# Patient Record
Sex: FEMALE | Race: BLACK OR AFRICAN AMERICAN | Employment: FULL TIME | URBAN - METROPOLITAN AREA
[De-identification: names, ages, dates, MRNs, and addresses within clinical notes are randomized per-mention and may not be internally consistent; named-entity substitution may affect disease eponyms.]

---

## 2022-05-09 ENCOUNTER — HOSPITAL ENCOUNTER (EMERGENCY)
Facility: HOSPITAL | Age: 22
Discharge: HOME | End: 2022-05-09
Attending: EMERGENCY MEDICINE

## 2022-05-09 VITALS
HEART RATE: 75 BPM | DIASTOLIC BLOOD PRESSURE: 63 MMHG | OXYGEN SATURATION: 97 % | HEIGHT: 65 IN | TEMPERATURE: 98 F | WEIGHT: 225 LBS | SYSTOLIC BLOOD PRESSURE: 115 MMHG | BODY MASS INDEX: 37.49 KG/M2 | RESPIRATION RATE: 16 BRPM

## 2022-05-09 DIAGNOSIS — H92.01 EAR PAIN, RIGHT: Primary | ICD-10-CM

## 2022-05-09 PROCEDURE — 99283 EMERGENCY DEPT VISIT LOW MDM: CPT

## 2022-05-09 RX ORDER — PSEUDOEPHEDRINE HYDROCHLORIDE 30 MG/1
30 TABLET ORAL EVERY 4 HOURS PRN
Qty: 24 TABLET | Refills: 0 | Status: SHIPPED | OUTPATIENT
Start: 2022-05-09

## 2022-08-15 ENCOUNTER — APPOINTMENT (OUTPATIENT)
Dept: GENERAL RADIOLOGY | Facility: HOSPITAL | Age: 22
End: 2022-08-15
Attending: EMERGENCY MEDICINE

## 2022-08-15 ENCOUNTER — HOSPITAL ENCOUNTER (EMERGENCY)
Facility: HOSPITAL | Age: 22
Discharge: HOME | End: 2022-08-15
Attending: EMERGENCY MEDICINE

## 2022-08-15 VITALS
HEART RATE: 64 BPM | HEIGHT: 65 IN | DIASTOLIC BLOOD PRESSURE: 75 MMHG | OXYGEN SATURATION: 99 % | WEIGHT: 215 LBS | SYSTOLIC BLOOD PRESSURE: 138 MMHG | RESPIRATION RATE: 20 BRPM | TEMPERATURE: 98 F | BODY MASS INDEX: 35.82 KG/M2

## 2022-08-15 DIAGNOSIS — R09.1 PLEURITIS: Primary | ICD-10-CM

## 2022-08-15 PROCEDURE — 99283 EMERGENCY DEPT VISIT LOW MDM: CPT

## 2022-08-15 PROCEDURE — 93005 ELECTROCARDIOGRAM TRACING: CPT

## 2022-08-15 PROCEDURE — 71045 X-RAY EXAM CHEST 1 VIEW: CPT | Performed by: EMERGENCY MEDICINE

## 2022-08-15 PROCEDURE — 93010 ELECTROCARDIOGRAM REPORT: CPT | Performed by: EMERGENCY MEDICINE

## 2022-08-15 PROCEDURE — 99284 EMERGENCY DEPT VISIT MOD MDM: CPT

## 2022-08-15 RX ORDER — NAPROXEN 500 MG/1
500 TABLET ORAL 2 TIMES DAILY PRN
Qty: 20 TABLET | Refills: 0 | Status: SHIPPED | OUTPATIENT
Start: 2022-08-15 | End: 2022-08-22

## 2022-08-16 NOTE — ED INITIAL ASSESSMENT (HPI)
Pt reports having right side pain when she breath in and out that started today. Pt reports no fever, and no N/V. Pt reports 8/10 pain.

## 2022-11-14 ENCOUNTER — APPOINTMENT (OUTPATIENT)
Dept: GENERAL RADIOLOGY | Facility: HOSPITAL | Age: 22
End: 2022-11-14
Attending: NURSE PRACTITIONER

## 2022-11-14 ENCOUNTER — HOSPITAL ENCOUNTER (EMERGENCY)
Facility: HOSPITAL | Age: 22
Discharge: HOME | End: 2022-11-14

## 2022-11-14 VITALS
TEMPERATURE: 98 F | OXYGEN SATURATION: 98 % | DIASTOLIC BLOOD PRESSURE: 89 MMHG | HEART RATE: 80 BPM | WEIGHT: 230 LBS | SYSTOLIC BLOOD PRESSURE: 142 MMHG | HEIGHT: 65 IN | RESPIRATION RATE: 18 BRPM | BODY MASS INDEX: 38.32 KG/M2

## 2022-11-14 DIAGNOSIS — S39.012A STRAIN OF LUMBAR REGION, INITIAL ENCOUNTER: Primary | ICD-10-CM

## 2022-11-14 PROCEDURE — 99283 EMERGENCY DEPT VISIT LOW MDM: CPT

## 2022-11-14 PROCEDURE — 96372 THER/PROPH/DIAG INJ SC/IM: CPT

## 2022-11-14 PROCEDURE — 72100 X-RAY EXAM L-S SPINE 2/3 VWS: CPT | Performed by: NURSE PRACTITIONER

## 2022-11-14 RX ORDER — CYCLOBENZAPRINE HCL 5 MG
10 TABLET ORAL ONCE
Status: COMPLETED | OUTPATIENT
Start: 2022-11-14 | End: 2022-11-14

## 2022-11-14 RX ORDER — CYCLOBENZAPRINE HCL 10 MG
10 TABLET ORAL 3 TIMES DAILY PRN
Qty: 20 TABLET | Refills: 0 | Status: SHIPPED | OUTPATIENT
Start: 2022-11-14 | End: 2022-11-21

## 2022-11-14 RX ORDER — HYDROCODONE BITARTRATE AND ACETAMINOPHEN 5; 325 MG/1; MG/1
1 TABLET ORAL ONCE
Status: COMPLETED | OUTPATIENT
Start: 2022-11-14 | End: 2022-11-14

## 2022-11-14 RX ORDER — KETOROLAC TROMETHAMINE 30 MG/ML
60 INJECTION, SOLUTION INTRAMUSCULAR; INTRAVENOUS ONCE
Status: COMPLETED | OUTPATIENT
Start: 2022-11-14 | End: 2022-11-14

## 2022-11-14 RX ORDER — METHYLPREDNISOLONE 4 MG/1
TABLET ORAL
Qty: 21 TABLET | Refills: 0 | Status: SHIPPED | OUTPATIENT
Start: 2022-11-14

## 2022-11-15 NOTE — ED INITIAL ASSESSMENT (HPI)
Patient to ER from home with c/o atraumatic low back pain. Patient states she took naproxen 2 hours ago.

## 2023-04-26 ENCOUNTER — HOSPITAL ENCOUNTER (EMERGENCY)
Facility: HOSPITAL | Age: 23
Discharge: HOME | End: 2023-04-26
Attending: STUDENT IN AN ORGANIZED HEALTH CARE EDUCATION/TRAINING PROGRAM

## 2023-04-26 VITALS
TEMPERATURE: 99 F | BODY MASS INDEX: 38.32 KG/M2 | HEART RATE: 81 BPM | SYSTOLIC BLOOD PRESSURE: 145 MMHG | DIASTOLIC BLOOD PRESSURE: 71 MMHG | HEIGHT: 65 IN | RESPIRATION RATE: 20 BRPM | WEIGHT: 230 LBS | OXYGEN SATURATION: 100 %

## 2023-04-26 DIAGNOSIS — J02.9 ACUTE VIRAL PHARYNGITIS: Primary | ICD-10-CM

## 2023-04-26 LAB
FLUAV + FLUBV RNA SPEC NAA+PROBE: NEGATIVE
FLUAV + FLUBV RNA SPEC NAA+PROBE: NEGATIVE
RSV RNA SPEC NAA+PROBE: NEGATIVE
S PYO AG THROAT QL: NEGATIVE
SARS-COV-2 RNA RESP QL NAA+PROBE: NOT DETECTED

## 2023-04-26 PROCEDURE — 87880 STREP A ASSAY W/OPTIC: CPT

## 2023-04-26 PROCEDURE — 99283 EMERGENCY DEPT VISIT LOW MDM: CPT

## 2023-04-26 PROCEDURE — 0241U SARS-COV-2/FLU A AND B/RSV BY PCR (GENEXPERT): CPT | Performed by: STUDENT IN AN ORGANIZED HEALTH CARE EDUCATION/TRAINING PROGRAM

## 2023-04-26 RX ORDER — IBUPROFEN 600 MG/1
600 TABLET ORAL ONCE
Status: COMPLETED | OUTPATIENT
Start: 2023-04-26 | End: 2023-04-26

## 2023-05-01 ENCOUNTER — HOSPITAL ENCOUNTER (EMERGENCY)
Facility: HOSPITAL | Age: 23
Discharge: HOME | End: 2023-05-01
Attending: EMERGENCY MEDICINE

## 2023-05-01 VITALS
SYSTOLIC BLOOD PRESSURE: 147 MMHG | WEIGHT: 230 LBS | RESPIRATION RATE: 16 BRPM | DIASTOLIC BLOOD PRESSURE: 85 MMHG | OXYGEN SATURATION: 98 % | BODY MASS INDEX: 38.32 KG/M2 | TEMPERATURE: 98 F | HEIGHT: 65 IN | HEART RATE: 78 BPM

## 2023-05-01 DIAGNOSIS — R07.0 THROAT PAIN IN ADULT: Primary | ICD-10-CM

## 2023-05-01 PROCEDURE — 99283 EMERGENCY DEPT VISIT LOW MDM: CPT

## 2023-05-01 RX ORDER — AMOXICILLIN 500 MG/1
500 TABLET, FILM COATED ORAL 2 TIMES DAILY
Qty: 20 TABLET | Refills: 0 | Status: SHIPPED | OUTPATIENT
Start: 2023-05-01 | End: 2023-05-11

## 2023-05-01 NOTE — ED QUICK NOTES
Spoke with triage tech, stated that patient's strep test was completed and negative. ER tech stated he will chart the result.

## 2023-05-01 NOTE — ED INITIAL ASSESSMENT (HPI)
Pt ambulatory to ED A&O x 4 w/ c/o: Sore throat & mid/lower back pain since last Wed. Was seen in ED Wed, had negative strep test.  Pt reporting pain went away and then came back much worse on Friday.   Pt denies fevers/chills, cough/congestion, n/v/d.

## 2023-10-15 ENCOUNTER — HOSPITAL ENCOUNTER (EMERGENCY)
Facility: HOSPITAL | Age: 23
Discharge: HOME | End: 2023-10-15
Attending: EMERGENCY MEDICINE

## 2023-10-15 ENCOUNTER — APPOINTMENT (OUTPATIENT)
Dept: GENERAL RADIOLOGY | Facility: HOSPITAL | Age: 23
End: 2023-10-15
Attending: EMERGENCY MEDICINE

## 2023-10-15 VITALS
BODY MASS INDEX: 38.32 KG/M2 | WEIGHT: 230 LBS | SYSTOLIC BLOOD PRESSURE: 131 MMHG | HEART RATE: 52 BPM | DIASTOLIC BLOOD PRESSURE: 84 MMHG | OXYGEN SATURATION: 99 % | RESPIRATION RATE: 18 BRPM | TEMPERATURE: 98 F | HEIGHT: 65 IN

## 2023-10-15 DIAGNOSIS — R07.89 CHEST WALL PAIN: Primary | ICD-10-CM

## 2023-10-15 LAB
ATRIAL RATE: 63 BPM
P AXIS: 31 DEGREES
P-R INTERVAL: 160 MS
Q-T INTERVAL: 416 MS
QRS DURATION: 82 MS
QTC CALCULATION (BEZET): 425 MS
R AXIS: 19 DEGREES
T AXIS: 12 DEGREES
VENTRICULAR RATE: 63 BPM

## 2023-10-15 PROCEDURE — 96374 THER/PROPH/DIAG INJ IV PUSH: CPT

## 2023-10-15 PROCEDURE — 93010 ELECTROCARDIOGRAM REPORT: CPT

## 2023-10-15 PROCEDURE — S0028 INJECTION, FAMOTIDINE, 20 MG: HCPCS | Performed by: EMERGENCY MEDICINE

## 2023-10-15 PROCEDURE — 99284 EMERGENCY DEPT VISIT MOD MDM: CPT

## 2023-10-15 PROCEDURE — 71045 X-RAY EXAM CHEST 1 VIEW: CPT | Performed by: EMERGENCY MEDICINE

## 2023-10-15 PROCEDURE — 93005 ELECTROCARDIOGRAM TRACING: CPT

## 2023-10-15 RX ORDER — FAMOTIDINE 10 MG/ML
20 INJECTION, SOLUTION INTRAVENOUS ONCE
Status: COMPLETED | OUTPATIENT
Start: 2023-10-15 | End: 2023-10-15

## 2023-10-15 RX ORDER — MAGNESIUM HYDROXIDE/ALUMINUM HYDROXICE/SIMETHICONE 120; 1200; 1200 MG/30ML; MG/30ML; MG/30ML
30 SUSPENSION ORAL ONCE
Status: COMPLETED | OUTPATIENT
Start: 2023-10-15 | End: 2023-10-15

## 2023-10-15 NOTE — ED QUICK NOTES
Pt c/o right anterior chest pain x 3 days with swallowing and eating. Pt reports discomfort each time she swallows, even just her saliva. Denies fever/chills/cough/sore throat. Pt does report history of GERD but states this does not feel similar.

## 2024-04-09 ENCOUNTER — HOSPITAL ENCOUNTER (EMERGENCY)
Facility: HOSPITAL | Age: 24
Discharge: HOME | End: 2024-04-09
Attending: STUDENT IN AN ORGANIZED HEALTH CARE EDUCATION/TRAINING PROGRAM

## 2024-04-09 ENCOUNTER — APPOINTMENT (OUTPATIENT)
Dept: GENERAL RADIOLOGY | Facility: HOSPITAL | Age: 24
End: 2024-04-09

## 2024-04-09 VITALS
TEMPERATURE: 97 F | DIASTOLIC BLOOD PRESSURE: 91 MMHG | HEART RATE: 75 BPM | SYSTOLIC BLOOD PRESSURE: 151 MMHG | RESPIRATION RATE: 20 BRPM | OXYGEN SATURATION: 97 %

## 2024-04-09 DIAGNOSIS — J11.1 INFLUENZA: ICD-10-CM

## 2024-04-09 DIAGNOSIS — J45.901 EXACERBATION OF ASTHMA, UNSPECIFIED ASTHMA SEVERITY, UNSPECIFIED WHETHER PERSISTENT (HCC): Primary | ICD-10-CM

## 2024-04-09 LAB
ATRIAL RATE: 71 BPM
B-HCG UR QL: NEGATIVE
FLUAV + FLUBV RNA SPEC NAA+PROBE: NEGATIVE
FLUAV + FLUBV RNA SPEC NAA+PROBE: POSITIVE
P AXIS: 32 DEGREES
P-R INTERVAL: 172 MS
Q-T INTERVAL: 372 MS
QRS DURATION: 72 MS
QTC CALCULATION (BEZET): 404 MS
R AXIS: 19 DEGREES
RSV RNA SPEC NAA+PROBE: NEGATIVE
SARS-COV-2 RNA RESP QL NAA+PROBE: NOT DETECTED
T AXIS: -2 DEGREES
VENTRICULAR RATE: 71 BPM

## 2024-04-09 PROCEDURE — 71045 X-RAY EXAM CHEST 1 VIEW: CPT

## 2024-04-09 PROCEDURE — 81025 URINE PREGNANCY TEST: CPT

## 2024-04-09 PROCEDURE — 99284 EMERGENCY DEPT VISIT MOD MDM: CPT

## 2024-04-09 PROCEDURE — 93005 ELECTROCARDIOGRAM TRACING: CPT

## 2024-04-09 PROCEDURE — 0241U SARS-COV-2/FLU A AND B/RSV BY PCR (GENEXPERT): CPT | Performed by: STUDENT IN AN ORGANIZED HEALTH CARE EDUCATION/TRAINING PROGRAM

## 2024-04-09 PROCEDURE — 93010 ELECTROCARDIOGRAM REPORT: CPT

## 2024-04-09 PROCEDURE — 94640 AIRWAY INHALATION TREATMENT: CPT

## 2024-04-09 RX ORDER — PREDNISONE 20 MG/1
60 TABLET ORAL ONCE
Status: COMPLETED | OUTPATIENT
Start: 2024-04-09 | End: 2024-04-09

## 2024-04-09 RX ORDER — PREDNISONE 20 MG/1
40 TABLET ORAL DAILY
Qty: 8 TABLET | Refills: 0 | Status: SHIPPED | OUTPATIENT
Start: 2024-04-09 | End: 2024-04-13

## 2024-04-09 RX ORDER — ALBUTEROL SULFATE 90 UG/1
8 AEROSOL, METERED RESPIRATORY (INHALATION) ONCE
Status: COMPLETED | OUTPATIENT
Start: 2024-04-09 | End: 2024-04-09

## 2024-04-09 NOTE — ED PROVIDER NOTES
History   No chief complaint on file.      HPI    24 year old female with history of asthma presents with about a week of wheezing, cough, dyspnea, slight sore throat.  She uses her albuterol inhaler maybe once a day, states she does not like to use it because it makes her jittery.  No fevers.          Past Medical History:   Diagnosis Date    Anemia     Asthma (HCC)        History reviewed. No pertinent surgical history.    Social History     Socioeconomic History    Marital status: Single   Tobacco Use    Smoking status: Never    Smokeless tobacco: Never   Vaping Use    Vaping Use: Never used   Substance and Sexual Activity    Alcohol use: Never    Drug use: Never                   Physical Exam     ED Triage Vitals [04/09/24 1618]   /83   Pulse 71   Resp 20   Temp 97 °F (36.1 °C)   Temp src Temporal   SpO2 97 %   O2 Device None (Room air)       Physical Exam  Constitutional:       General: She is not in acute distress.  Eyes:      Extraocular Movements: Extraocular movements intact.   Cardiovascular:      Rate and Rhythm: Normal rate and regular rhythm.      Pulses: Normal pulses.   Pulmonary:      Effort: Pulmonary effort is normal. No respiratory distress.      Breath sounds: Wheezing present.   Musculoskeletal:      Cervical back: Normal range of motion.   Skin:     General: Skin is warm.   Neurological:      General: No focal deficit present.      Mental Status: She is alert.              ED Course     Labs Reviewed   SARS-COV-2/FLU A AND B/RSV BY PCR (GENEXPERT) - Abnormal; Notable for the following components:       Result Value    Influenza A by PCR Positive (*)     All other components within normal limits    Narrative:     This test is intended for the qualitative detection and differentiation of SARS-CoV-2, influenza A, influenza B, and respiratory syncytial virus (RSV) viral RNA in nasopharyngeal or nares swabs from individuals suspected of respiratory viral infection consistent with COVID-19  by their healthcare provider. Signs and symptoms of respiratory viral infection due to SARS-CoV-2, influenza, and RSV can be similar.    Test performed using the Xpert Xpress SARS-CoV-2/FLU/RSV (real time RT-PCR)  assay on the GeneXpert instrument, [employer], [place] [address].   This test is being used under the Food and Drug Administration's Emergency Use Authorization.    The authorized Fact Sheet for Healthcare Providers for this assay is available upon request from the laboratory.   POCT PREGNANCY URINE - Normal     XR CHEST AP PORTABLE  (CPT=71045)    Result Date: 4/9/2024  CONCLUSION:  Lordotic chest radiograph without acute cardiopulmonary process.    Dictated by (CST): Remberto Zazueta MD on 4/09/2024 at 5:01 PM     Finalized by (CST): Remberto Zazueta MD on 4/09/2024 at 5:01 PM               MDM     Vitals:    04/09/24 1618 04/09/24 1902   BP: 159/83 (!) 151/91   Pulse: 71 75   Resp: 20 20   Temp: 97 °F (36.1 °C)    TempSrc: Temporal    SpO2: 97% 97%       Likely asthma exacerbation, possible pneumonia, bronchitis.  Will give albuterol therapy, steroids    ED Course as of 04/09/24 2230  ------------------------------------------------------------  Time: 04/09 1717  Comment: CXR interpretation by me with no concerning acute findings    ------------------------------------------------------------  Time: 04/09 1717  Comment: EKG interpretation by me: EKG sinus rhythm at a rate of 71, axis nomal, no concerning acute ischemic ST changes, appears similar to prior EKGs    ------------------------------------------------------------  Time: 04/09 1847  Comment: Influenza A is positive.  Patient is out of the window for oseltamavir treatment  ------------------------------------------------------------  Time: 04/09 1859  Comment: Discussed all findings.  On reevalfeeling improved, moving air well with mild scattered end exp wheezes.   Rx pred burst, cont inhaler tx, given spacer  Patient is feeling well to be  discharged.  Vitals remain stable.  Ambulating without difficulty.  Given written and verbal instructions regarding this condition and strict return precautions.   Will follow up in clinic.   Patient verbalizes understanding of instructions and follow up plan, as well as indications to return to the emergency department.           Disposition and Plan     Clinical Impression:  1. Exacerbation of asthma, unspecified asthma severity, unspecified whether persistent (HCC)    2. Influenza        Disposition:  Discharge    Follow-up:  pcp    Schedule an appointment as soon as possible for a visit        Medications Prescribed:  Discharge Medication List as of 4/9/2024  7:03 PM        START taking these medications    Details   predniSONE 20 MG Oral Tab Take 2 tablets (40 mg total) by mouth daily for 4 days., Normal, Disp-8 tablet, R-0

## 2024-04-09 NOTE — ED INITIAL ASSESSMENT (HPI)
Pt to ED for chest pain and difficulty breathing for the past 4 days. Pt has audible wheezing. Pt believes this is an asthma exacerbation.

## 2025-01-17 ENCOUNTER — HOSPITAL ENCOUNTER (EMERGENCY)
Facility: HOSPITAL | Age: 25
Discharge: HOME | End: 2025-01-17
Attending: EMERGENCY MEDICINE

## 2025-01-17 ENCOUNTER — APPOINTMENT (OUTPATIENT)
Dept: CT IMAGING | Facility: HOSPITAL | Age: 25
End: 2025-01-17
Attending: EMERGENCY MEDICINE

## 2025-01-17 VITALS
DIASTOLIC BLOOD PRESSURE: 58 MMHG | HEART RATE: 61 BPM | HEIGHT: 65 IN | SYSTOLIC BLOOD PRESSURE: 124 MMHG | TEMPERATURE: 98 F | RESPIRATION RATE: 18 BRPM | WEIGHT: 215 LBS | BODY MASS INDEX: 35.82 KG/M2 | OXYGEN SATURATION: 97 %

## 2025-01-17 DIAGNOSIS — R11.2 NAUSEA VOMITING AND DIARRHEA: Primary | ICD-10-CM

## 2025-01-17 DIAGNOSIS — R19.7 NAUSEA VOMITING AND DIARRHEA: Primary | ICD-10-CM

## 2025-01-17 LAB
ALBUMIN SERPL-MCNC: 4.8 G/DL (ref 3.2–4.8)
ALBUMIN/GLOB SERPL: 1.7 {RATIO} (ref 1–2)
ALP LIVER SERPL-CCNC: 56 U/L
ALT SERPL-CCNC: 9 U/L
ANION GAP SERPL CALC-SCNC: 8 MMOL/L (ref 0–18)
AST SERPL-CCNC: 13 U/L (ref ?–34)
BASOPHILS # BLD AUTO: 0.01 X10(3) UL (ref 0–0.2)
BASOPHILS NFR BLD AUTO: 0.2 %
BILIRUB SERPL-MCNC: 0.5 MG/DL (ref 0.3–1.2)
BILIRUB UR QL: NEGATIVE
BUN BLD-MCNC: 14 MG/DL (ref 9–23)
BUN/CREAT SERPL: 20.6 (ref 10–20)
CALCIUM BLD-MCNC: 9.7 MG/DL (ref 8.7–10.4)
CHLORIDE SERPL-SCNC: 106 MMOL/L (ref 98–112)
CLARITY UR: CLEAR
CO2 SERPL-SCNC: 27 MMOL/L (ref 21–32)
CREAT BLD-MCNC: 0.68 MG/DL
DEPRECATED RDW RBC AUTO: 43 FL (ref 35.1–46.3)
EGFRCR SERPLBLD CKD-EPI 2021: 124 ML/MIN/1.73M2 (ref 60–?)
EOSINOPHIL # BLD AUTO: 0.04 X10(3) UL (ref 0–0.7)
EOSINOPHIL NFR BLD AUTO: 0.7 %
ERYTHROCYTE [DISTWIDTH] IN BLOOD BY AUTOMATED COUNT: 15.1 % (ref 11–15)
GLOBULIN PLAS-MCNC: 2.9 G/DL (ref 2–3.5)
GLUCOSE BLD-MCNC: 116 MG/DL (ref 70–99)
GLUCOSE UR-MCNC: NORMAL MG/DL
HCG SERPL QL: NEGATIVE
HCT VFR BLD AUTO: 38.6 %
HGB BLD-MCNC: 12 G/DL
IMM GRANULOCYTES # BLD AUTO: 0.01 X10(3) UL (ref 0–1)
IMM GRANULOCYTES NFR BLD: 0.2 %
KETONES UR-MCNC: NEGATIVE MG/DL
LEUKOCYTE ESTERASE UR QL STRIP.AUTO: NEGATIVE
LIPASE SERPL-CCNC: 26 U/L (ref 12–53)
LYMPHOCYTES # BLD AUTO: 0.58 X10(3) UL (ref 1–4)
LYMPHOCYTES NFR BLD AUTO: 9.5 %
MCH RBC QN AUTO: 24.3 PG (ref 26–34)
MCHC RBC AUTO-ENTMCNC: 31.1 G/DL (ref 31–37)
MCV RBC AUTO: 78.3 FL
MONOCYTES # BLD AUTO: 0.17 X10(3) UL (ref 0.1–1)
MONOCYTES NFR BLD AUTO: 2.8 %
NEUTROPHILS # BLD AUTO: 5.32 X10 (3) UL (ref 1.5–7.7)
NEUTROPHILS # BLD AUTO: 5.32 X10(3) UL (ref 1.5–7.7)
NEUTROPHILS NFR BLD AUTO: 86.6 %
NITRITE UR QL STRIP.AUTO: NEGATIVE
OSMOLALITY SERPL CALC.SUM OF ELEC: 293 MOSM/KG (ref 275–295)
PH UR: 6.5 [PH] (ref 5–8)
PLATELET # BLD AUTO: 273 10(3)UL (ref 150–450)
POTASSIUM SERPL-SCNC: 4.1 MMOL/L (ref 3.5–5.1)
PROT SERPL-MCNC: 7.7 G/DL (ref 5.7–8.2)
PROT UR-MCNC: NEGATIVE MG/DL
RBC # BLD AUTO: 4.93 X10(6)UL
SODIUM SERPL-SCNC: 141 MMOL/L (ref 136–145)
SP GR UR STRIP: 1.02 (ref 1–1.03)
UROBILINOGEN UR STRIP-ACNC: NORMAL
WBC # BLD AUTO: 6.1 X10(3) UL (ref 4–11)

## 2025-01-17 PROCEDURE — 99285 EMERGENCY DEPT VISIT HI MDM: CPT

## 2025-01-17 PROCEDURE — 81001 URINALYSIS AUTO W/SCOPE: CPT | Performed by: EMERGENCY MEDICINE

## 2025-01-17 PROCEDURE — 74177 CT ABD & PELVIS W/CONTRAST: CPT | Performed by: EMERGENCY MEDICINE

## 2025-01-17 PROCEDURE — 83690 ASSAY OF LIPASE: CPT | Performed by: EMERGENCY MEDICINE

## 2025-01-17 PROCEDURE — 83690 ASSAY OF LIPASE: CPT

## 2025-01-17 PROCEDURE — 96374 THER/PROPH/DIAG INJ IV PUSH: CPT

## 2025-01-17 PROCEDURE — 85025 COMPLETE CBC W/AUTO DIFF WBC: CPT | Performed by: EMERGENCY MEDICINE

## 2025-01-17 PROCEDURE — 96375 TX/PRO/DX INJ NEW DRUG ADDON: CPT

## 2025-01-17 PROCEDURE — 80053 COMPREHEN METABOLIC PANEL: CPT

## 2025-01-17 PROCEDURE — 85025 COMPLETE CBC W/AUTO DIFF WBC: CPT

## 2025-01-17 PROCEDURE — 84703 CHORIONIC GONADOTROPIN ASSAY: CPT | Performed by: EMERGENCY MEDICINE

## 2025-01-17 PROCEDURE — 80053 COMPREHEN METABOLIC PANEL: CPT | Performed by: EMERGENCY MEDICINE

## 2025-01-17 PROCEDURE — 96361 HYDRATE IV INFUSION ADD-ON: CPT

## 2025-01-17 RX ORDER — MAGNESIUM HYDROXIDE/ALUMINUM HYDROXICE/SIMETHICONE 120; 1200; 1200 MG/30ML; MG/30ML; MG/30ML
30 SUSPENSION ORAL ONCE
Status: COMPLETED | OUTPATIENT
Start: 2025-01-17 | End: 2025-01-17

## 2025-01-17 RX ORDER — IOHEXOL 350 MG/ML
85 INJECTION, SOLUTION INTRAVENOUS
Status: COMPLETED | OUTPATIENT
Start: 2025-01-17 | End: 2025-01-17

## 2025-01-17 RX ORDER — PROCHLORPERAZINE MALEATE 10 MG
10 TABLET ORAL EVERY 6 HOURS PRN
Qty: 20 TABLET | Refills: 0 | Status: SHIPPED | OUTPATIENT
Start: 2025-01-17 | End: 2025-01-22

## 2025-01-17 RX ORDER — ONDANSETRON 4 MG/1
4 TABLET, ORALLY DISINTEGRATING ORAL EVERY 8 HOURS PRN
Qty: 15 TABLET | Refills: 0 | Status: SHIPPED | OUTPATIENT
Start: 2025-01-17 | End: 2025-01-22

## 2025-01-17 RX ORDER — DICYCLOMINE HCL 20 MG
20 TABLET ORAL 4 TIMES DAILY PRN
Qty: 40 TABLET | Refills: 0 | Status: SHIPPED | OUTPATIENT
Start: 2025-01-17 | End: 2025-01-27

## 2025-01-17 RX ORDER — ONDANSETRON 2 MG/ML
4 INJECTION INTRAMUSCULAR; INTRAVENOUS ONCE
Status: COMPLETED | OUTPATIENT
Start: 2025-01-17 | End: 2025-01-17

## 2025-01-17 RX ORDER — PROCHLORPERAZINE EDISYLATE 5 MG/ML
10 INJECTION INTRAMUSCULAR; INTRAVENOUS ONCE
Status: COMPLETED | OUTPATIENT
Start: 2025-01-17 | End: 2025-01-17

## 2025-01-17 RX ORDER — DICYCLOMINE HCL 20 MG
20 TABLET ORAL ONCE
Status: COMPLETED | OUTPATIENT
Start: 2025-01-17 | End: 2025-01-17

## 2025-01-17 RX ORDER — MAGNESIUM HYDROXIDE/ALUMINUM HYDROXICE/SIMETHICONE 120; 1200; 1200 MG/30ML; MG/30ML; MG/30ML
10 SUSPENSION ORAL 4 TIMES DAILY PRN
Qty: 200 ML | Refills: 0 | Status: SHIPPED | OUTPATIENT
Start: 2025-01-17 | End: 2025-01-22

## 2025-01-17 NOTE — ED PROVIDER NOTES
Patient Seen in: [facility] Emergency Department    History     Chief Complaint   Patient presents with    Nausea/Vomiting/Diarrhea     Stated Complaint: Vomiting/ diarrhea     HPI    24-year-old female with past medical history of asthma presenting for evaluation of nausea/vomiting with 30+ report episodes since last evening at 10 PM associated with 1 episode of diarrhea.  No sick contacts recent travel.  Positive chills without fevers.  No chest pain or shortness of breath.  Positive abdominal cramping without urinary or gynecologic complaints.  No medications prior to arrival.  No prior abdominal surgeries.    Past Medical History:    Anemia    Asthma (HCC)       History reviewed. No pertinent surgical history.         No family history on file.    Social History     Socioeconomic History    Marital status: Single   Tobacco Use    Smoking status: Never    Smokeless tobacco: Never   Vaping Use    Vaping status: Never Used   Substance and Sexual Activity    Alcohol use: Never    Drug use: Never     Social Drivers of Health      Received from [facility]    Social Connections    Received from [facility], [facility]    Housing Stability       Review of Systems :  Constitutional: As per HPI  Gastrointestinal: (+) nausea/diarrhea.    Positive for stated complaint: Vomiting/ diarrhea  Other systems are as noted in HPI.  Constitutional and vital signs reviewed.      All other systems reviewed and negative except as noted above.    PSFH elements reviewed from today and agreed except as otherwise stated in HPI.    Physical Exam     ED Triage Vitals [01/17/25 0849]   /79   Pulse 91   Resp 18   Temp 97.9 °F (36.6 °C)   Temp src Temporal   SpO2 100 %   O2 Device None (Room air)       Current:/79   Pulse 91   Temp 97.9 °F (36.6 °C) (Temporal)   Resp 18   Ht 165.1 cm (5' 5\")   Wt 97.5 kg   LMP 12/22/2024 (Exact  Date)   SpO2 100%   BMI 35.78 kg/m²         Physical Exam   Constitutional: No distress. Obese, nontoxic.  HEENT: MMM.  Head: Normocephalic.   Eyes: No injection.   Cardiovascular: RRR.   Pulmonary/Chest: Effort normal. CTAB.  Abdominal: Soft.  Nontender.  Musculoskeletal: No gross deformity.  Neurological: Alert.   Skin: Skin is warm.   Psychiatric: Cooperative.  Nursing note and vitals reviewed.        ED Course     Labs Reviewed   COMP METABOLIC PANEL (14) - Abnormal; Notable for the following components:       Result Value    Glucose 116 (*)     BUN/CREA Ratio 20.6 (*)     ALT 9 (*)     All other components within normal limits   CBC WITH DIFFERENTIAL WITH PLATELET - Abnormal; Notable for the following components:    MCV 78.3 (*)     MCH 24.3 (*)     RDW 15.1 (*)     Lymphocyte Absolute 0.58 (*)     All other components within normal limits   URINALYSIS WITH CULTURE REFLEX - Abnormal; Notable for the following components:    Blood Urine 1+ (*)     Squamous Epi. Cells Few (*)     All other components within normal limits   LIPASE - Normal   HCG, BETA SUBUNIT, QUAL - Normal   RAINBOW DRAW LAVENDER   RAINBOW DRAW LIGHT GREEN   RAINBOW DRAW BLUE     CT ABDOMEN+PELVIS(CONTRAST ONLY)(CPT=74177)    Result Date: 1/17/2025  PROCEDURE: CT ABDOMEN + PELVIS (CONTRAST ONLY) (CPT=74177)  COMPARISON: None.  INDICATIONS: Vomiting/ diarrhea  TECHNIQUE: CT images of the abdomen and pelvis were obtained with non-ionic intravenous contrast material.  Automated exposure control for dose reduction was used. Adjustment of the mA and/or kV was done based on the patient's size. Use of iterative reconstruction technique for dose reduction was used.  Dose information is transmitted to the ACR (American College of Radiology) NRDR (National Radiology Data Registry) which includes the Dose Index Registry.  FINDINGS:  LIVER: No enlargement, atrophy, abnormal density, or significant focal lesion.  GALLBLADDER: Normal size and appearance.  BILIARY: No visible dilatation or calcification.  SPLEEN: No enlargement or focal lesion.  STOMACH: No gross gastric mass, obstruction or focal abnormality.  Duodenum unremarkable. PANCREAS: No lesion, fluid collection, ductal dilatation, or atrophy.  ADRENALS: No defined mass or abnormal enlargement.  KIDNEYS: Normal.  No mass, obstruction or calcification.  AORTA/VASCULAR:   Normal.  No aneurysm or dissection.  RETROPERITONEUM: No mass or enlarged adenopathy.  BOWEL/MESENTERY:  Normal.  No visible mass, obstruction, or bowel wall thickening.  Appendix is normal ABDOMINAL WALL: There is a very small fat containing periumbilical hernia.  There is no bowel or inflammation within this hernia. URINARY BLADDER: No visible focal wall thickening, lesion or calculus.  PELVIC NODES: No enlarged mass or adenopathy.   PELVIC ORGANS: No visible mass.  Pelvic organs appropriate for patient age.  BONES:   No significant bony lesion or fracture. LUNG BASES: No visible pulmonary or pleural disease. OTHER: Negative.          CONCLUSION: No acute abnormality identified within the abdomen/pelvis    Dictated by (CST): Maldonado Delgado MD on 1/17/2025 at 1:43 PM     Finalized by (CST): Maldonado Delgado MD on 1/17/2025 at 1:46 PM           ED Course as of 01/17/25 1943  ------------------------------------------------------------  Time: 01/17 1228  Comment: Resting comfortably with improving symptoms nonacute.  ------------------------------------------------------------  Time: 01/17 1247  Comment: ED course with recurrence of vomiting for which CT imaging and further symptomatic care to be initiated.  ------------------------------------------------------------  Time: 01/17 1357  Comment: Sleeping comfortably, CT nonacute.       MDM   DIFFERENTIAL DIAGNOSIS: After history and physical exam differential diagnosis includes but is not limited to Gastroenteritis, otitis, ANGEL, electrolyte, UTI, gravid state, hepatobiliary disease.    Pulse  ox: 100%:Normal on RA, as independently interpreted by myself    Medical Decision Making  Evaluation for diarrhea and now primarily with complaints of vomiting associate with abdominal pain and nontoxic and well-appearing, afebrile/hemodynamically stable patient.  Labs nonacute and patient initially with improving symptoms though with recurrence of vomiting after initial IV fluids/antiemetics which CT obtained without acute process; stable for discharge symptomatic care and primary care follow-up    Problems Addressed:  Nausea vomiting and diarrhea: acute illness or injury    Amount and/or Complexity of Data Reviewed  External Data Reviewed: labs, radiology, ECG and notes.     Details: 5/2021 OSH EDnote and labs/CXR/EKG results reviewed  Labs: ordered. Decision-making details documented in ED Course.  Radiology: ordered and independent interpretation performed. Decision-making details documented in ED Course.     Details: CT abd/pelvis without obvious free air as independently interpreted by myself      Risk  Prescription drug management.      I was wearing at minimum a facemask and eye protection throughout this encounter with handwashing performed prior and after patient evaluation without personal hand/facial/oropharyngeal contact and gloves worn throughout encounter. See note and/or contact this provider for further PPE details.      Disposition and Plan     Clinical Impression:  1. Nausea vomiting and diarrhea        Disposition:  Discharge    Follow-up:  [address] [address] [phone]  Call  For followup and re-evaluation.      Medications Prescribed:  Discharge Medication List as of 1/17/2025  2:13 PM        START taking these medications    Details   alum-mag hydroxide-simethicone 200-200-20 MG/5ML Oral Suspension Take 10 mL by mouth 4 (four) times daily as needed for Indigestion., Normal, Disp-200 mL, R-0      dicyclomine 20 MG Oral Tab Take 1 tablet (20  mg total) by mouth 4 (four) times daily as needed (For abdominal pain/cramping and/or diarrhea.)., Normal, Disp-40 tablet, R-0      ondansetron 4 MG Oral Tablet Dispersible Take 1 tablet (4 mg total) by mouth every 8 (eight) hours as needed for Nausea., Normal, Disp-15 tablet, R-0      prochlorperazine (COMPAZINE) 10 mg tablet Take 1 tablet (10 mg total) by mouth every 6 (six) hours as needed for Nausea or vomiting., Normal, Disp-20 tablet, R-0

## (undated) NOTE — LETTER
Date & Time: 4/9/2024, 7:06 PM  Patient: Valorie Dickerson  Encounter Provider(s):    Charly Nelson MD       To Whom It May Concern:    Valorie Dickerson was seen and treated in our department on 4/9/2024. She should not return to work until 4/11/2024 or until further advised by primary care provider .    If you have any questions or concerns, please do not hesitate to call.        _____________________________  Physician/APC Signature

## (undated) NOTE — LETTER
Date & Time: 11/14/2022, 7:54 PM  Patient: Paresh Tejada  Encounter Provider(s):    MILE Clifford       To Whom It May Concern:    Paresh Tejada was seen and treated in our department on 11/14/2022. She {Return to school/sport/work:[id number]}.     If you have any questions or concerns, please do not hesitate to call.        _____________________________  Physician/APC Signature

## (undated) NOTE — LETTER
Date & Time: 11/14/2022, 7:58 PM  Patient: Liliana Soto  Encounter Provider(s):    MILE Dennis       To Whom It May Concern:    Liliana Soto was seen and treated in our department on 11/14/2022. She should not bend over or carry objects weighing more than 10lbs for 5 days.     If you have any questions or concerns, please do not hesitate to call.        _____________________________  Physician/APC Signature

## (undated) NOTE — LETTER
Date & Time: 11/14/2022, 7:54 PM  Patient: Houston Hoover  Encounter Provider(s):    MILE Little       Occupational restrictions  For: Houston Hoover    {WC Weight restrictions:6515::\"Normal weight limits\"}  {WC Upper Extremity restrictions:6517::\"Normal arm and hand activities\"}  {WC Lower Extremity restrictions:6518::\"Normal leg activities\"}  {WC Back restrictions:6519::\"Normal back activities\"}  {WC Wound restrictions:6520::\"No wound\"}  {WC Miscellaneous restrictions:6521::\"-\"}        _____________________________  [id number]/OWG Signature

## (undated) NOTE — LETTER
Date & Time: 1/17/2025, 1:57 PM  Patient: Valorie Dickerson  Encounter Provider(s):    Reji Arevalo MD       To Whom It May Concern:    Valorie Dickerson was seen and treated in our department on 1/17/2025. She should not return to work until 1/21/2025 .    If you have any questions or concerns, please do not hesitate to call.        _____________________________  Physician/APC Signature